# Patient Record
Sex: MALE | Race: OTHER | NOT HISPANIC OR LATINO | URBAN - METROPOLITAN AREA
[De-identification: names, ages, dates, MRNs, and addresses within clinical notes are randomized per-mention and may not be internally consistent; named-entity substitution may affect disease eponyms.]

---

## 2023-09-20 ENCOUNTER — EMERGENCY (EMERGENCY)
Facility: HOSPITAL | Age: 63
LOS: 1 days | Discharge: HOME | End: 2023-09-20
Attending: EMERGENCY MEDICINE
Payer: SELF-PAY

## 2023-09-20 VITALS — HEIGHT: 68 IN | WEIGHT: 179.9 LBS

## 2023-09-20 VITALS
RESPIRATION RATE: 17 BRPM | TEMPERATURE: 98 F | HEART RATE: 76 BPM | DIASTOLIC BLOOD PRESSURE: 67 MMHG | OXYGEN SATURATION: 98 % | SYSTOLIC BLOOD PRESSURE: 103 MMHG

## 2023-09-20 PROCEDURE — 99053 MED SERV 10PM-8AM 24 HR FAC: CPT

## 2023-09-20 PROCEDURE — 99285 EMERGENCY DEPT VISIT HI MDM: CPT

## 2023-09-20 PROCEDURE — 99283 EMERGENCY DEPT VISIT LOW MDM: CPT

## 2023-09-20 PROCEDURE — 82962 GLUCOSE BLOOD TEST: CPT
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
## 2023-09-20 NOTE — ED ADULT NURSE NOTE - OBJECTIVE STATEMENT
BIBA for evaluation of possible ETOH. Per report pt was found down outside sleeping with minimal response. on assessment, pt sleeping, wakes to verbal stimuli, pt with slurred speech. Pt reeks of alcohol. Pt present to ED as unknown, however pt was unable to verbalize his name and .

## 2023-09-20 NOTE — ED PROVIDER NOTE - PATIENT PORTAL LINK FT
You can access the FollowMyHealth Patient Portal offered by [facility] by registering at the following website: [email or web address]. By joining [employer]’s FollowMyHealth portal, you will also be able to view your health information using other applications (apps) compatible with our system.

## 2023-09-20 NOTE — ED ADULT NURSE NOTE - NSFALLHARMRISKINTERV_ED_ALL_ED

## 2023-09-20 NOTE — ED PROVIDER NOTE - NSFOLLOWUPINSTRUCTIONS_ED_ALL_ED_FT
When you are ready to stop drinking alcohol, make appointment at detox center above.  Followup with PMD for reevaluation.  Return to ED if you develop worsening symptoms
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
## 2023-09-20 NOTE — ED PROVIDER NOTE - NEUROLOGICAL, MLM
Alert and alert-refuses to answer orientation questions, no focal deficits, no motor or sensory deficits.

## 2023-09-20 NOTE — ED PROVIDER NOTE - CONSTITUTIONAL DISTRESS
MILD Finasteride Male Counseling: Finasteride Counseling:  I discussed with the patient the risks of use of finasteride including but not limited to decreased libido, decreased ejaculate volume, gynecomastia, and depression. Women should not handle medication.  All of the patient's questions and concerns were addressed. Finasteride Counseling:  I discussed with the patient the risks of use of finasteride including but not limited to decreased libido, decreased ejaculate volume, gynecomastia, and depression. Women should not handle medication.  All of the patient's questions and concerns were addressed.